# Patient Record
Sex: FEMALE | Race: OTHER
[De-identification: names, ages, dates, MRNs, and addresses within clinical notes are randomized per-mention and may not be internally consistent; named-entity substitution may affect disease eponyms.]

---

## 2021-04-20 ENCOUNTER — ASOB RESULT (OUTPATIENT)
Age: 31
End: 2021-04-20

## 2021-04-20 ENCOUNTER — APPOINTMENT (OUTPATIENT)
Dept: ANTEPARTUM | Facility: CLINIC | Age: 31
End: 2021-04-20
Payer: MEDICAID

## 2021-04-20 PROBLEM — Z00.00 ENCOUNTER FOR PREVENTIVE HEALTH EXAMINATION: Status: ACTIVE | Noted: 2021-04-20

## 2021-04-20 PROCEDURE — 76830 TRANSVAGINAL US NON-OB: CPT | Mod: 59

## 2021-04-20 PROCEDURE — 76856 US EXAM PELVIC COMPLETE: CPT | Mod: 59

## 2021-04-20 PROCEDURE — 99072 ADDL SUPL MATRL&STAF TM PHE: CPT

## 2021-05-11 ENCOUNTER — INPATIENT (INPATIENT)
Facility: HOSPITAL | Age: 31
LOS: 0 days | Discharge: HOME | DRG: 424 | End: 2021-05-12
Attending: HOSPITALIST | Admitting: INTERNAL MEDICINE
Payer: COMMERCIAL

## 2021-05-11 VITALS
HEIGHT: 55.12 IN | HEART RATE: 135 BPM | RESPIRATION RATE: 20 BRPM | OXYGEN SATURATION: 98 % | DIASTOLIC BLOOD PRESSURE: 98 MMHG | WEIGHT: 130.07 LBS | SYSTOLIC BLOOD PRESSURE: 143 MMHG | TEMPERATURE: 99 F

## 2021-05-11 LAB
ADD ON TEST-SPECIMEN IN LAB: SIGNIFICANT CHANGE UP
ADD ON TEST-SPECIMEN IN LAB: SIGNIFICANT CHANGE UP
APPEARANCE UR: CLEAR — SIGNIFICANT CHANGE UP
APTT BLD: 30.2 SEC — SIGNIFICANT CHANGE UP (ref 27.5–35.5)
BASOPHILS # BLD AUTO: 0.01 K/UL — SIGNIFICANT CHANGE UP (ref 0–0.2)
BASOPHILS NFR BLD AUTO: 0.1 % — SIGNIFICANT CHANGE UP (ref 0–2)
BILIRUB UR-MCNC: NEGATIVE — SIGNIFICANT CHANGE UP
COLOR SPEC: YELLOW — SIGNIFICANT CHANGE UP
DIFF PNL FLD: NEGATIVE — SIGNIFICANT CHANGE UP
EOSINOPHIL # BLD AUTO: 0.02 K/UL — SIGNIFICANT CHANGE UP (ref 0–0.5)
EOSINOPHIL NFR BLD AUTO: 0.3 % — SIGNIFICANT CHANGE UP (ref 0–6)
GLUCOSE UR QL: NEGATIVE MG/DL — SIGNIFICANT CHANGE UP
HCG SERPL-ACNC: <1 MIU/ML — SIGNIFICANT CHANGE UP
HCT VFR BLD CALC: 37.8 % — SIGNIFICANT CHANGE UP (ref 34.5–45)
HGB BLD-MCNC: 13 G/DL — SIGNIFICANT CHANGE UP (ref 11.5–15.5)
IMM GRANULOCYTES NFR BLD AUTO: 0.3 % — SIGNIFICANT CHANGE UP (ref 0–1.5)
INR BLD: 1.14 RATIO — SIGNIFICANT CHANGE UP (ref 0.88–1.16)
KETONES UR-MCNC: ABNORMAL
LACTATE SERPL-SCNC: 1.3 MMOL/L — SIGNIFICANT CHANGE UP (ref 0.7–2)
LEUKOCYTE ESTERASE UR-ACNC: ABNORMAL
LIDOCAIN IGE QN: 98 U/L — SIGNIFICANT CHANGE UP (ref 73–393)
LYMPHOCYTES # BLD AUTO: 2.47 K/UL — SIGNIFICANT CHANGE UP (ref 1–3.3)
LYMPHOCYTES # BLD AUTO: 31.7 % — SIGNIFICANT CHANGE UP (ref 13–44)
MCHC RBC-ENTMCNC: 25.9 PG — LOW (ref 27–34)
MCHC RBC-ENTMCNC: 34.4 GM/DL — SIGNIFICANT CHANGE UP (ref 32–36)
MCV RBC AUTO: 75.4 FL — LOW (ref 80–100)
MONOCYTES # BLD AUTO: 0.74 K/UL — SIGNIFICANT CHANGE UP (ref 0–0.9)
MONOCYTES NFR BLD AUTO: 9.5 % — SIGNIFICANT CHANGE UP (ref 2–14)
NEUTROPHILS # BLD AUTO: 4.53 K/UL — SIGNIFICANT CHANGE UP (ref 1.8–7.4)
NEUTROPHILS NFR BLD AUTO: 58.1 % — SIGNIFICANT CHANGE UP (ref 43–77)
NITRITE UR-MCNC: NEGATIVE — SIGNIFICANT CHANGE UP
PH UR: 5 — SIGNIFICANT CHANGE UP (ref 5–8)
PLATELET # BLD AUTO: 284 K/UL — SIGNIFICANT CHANGE UP (ref 150–400)
PROT UR-MCNC: NEGATIVE MG/DL — SIGNIFICANT CHANGE UP
PROTHROM AB SERPL-ACNC: 13.3 SEC — SIGNIFICANT CHANGE UP (ref 10.6–13.6)
RAPID RVP RESULT: SIGNIFICANT CHANGE UP
RBC # BLD: 5.01 M/UL — SIGNIFICANT CHANGE UP (ref 3.8–5.2)
RBC # FLD: 11.8 % — SIGNIFICANT CHANGE UP (ref 10.3–14.5)
SARS-COV-2 RNA SPEC QL NAA+PROBE: SIGNIFICANT CHANGE UP
SP GR SPEC: 1.02 — SIGNIFICANT CHANGE UP (ref 1.01–1.02)
TROPONIN I SERPL-MCNC: <0.015 NG/ML — SIGNIFICANT CHANGE UP (ref 0.01–0.04)
UROBILINOGEN FLD QL: NEGATIVE MG/DL — SIGNIFICANT CHANGE UP
WBC # BLD: 7.79 K/UL — SIGNIFICANT CHANGE UP (ref 3.8–10.5)
WBC # FLD AUTO: 7.79 K/UL — SIGNIFICANT CHANGE UP (ref 3.8–10.5)

## 2021-05-11 PROCEDURE — 73090 X-RAY EXAM OF FOREARM: CPT | Mod: 26,LT

## 2021-05-11 PROCEDURE — 83036 HEMOGLOBIN GLYCOSYLATED A1C: CPT

## 2021-05-11 PROCEDURE — 93005 ELECTROCARDIOGRAM TRACING: CPT

## 2021-05-11 PROCEDURE — 84480 ASSAY TRIIODOTHYRONINE (T3): CPT

## 2021-05-11 PROCEDURE — 86769 SARS-COV-2 COVID-19 ANTIBODY: CPT

## 2021-05-11 PROCEDURE — 84439 ASSAY OF FREE THYROXINE: CPT

## 2021-05-11 PROCEDURE — 74177 CT ABD & PELVIS W/CONTRAST: CPT | Mod: 26,MA

## 2021-05-11 PROCEDURE — 76536 US EXAM OF HEAD AND NECK: CPT

## 2021-05-11 PROCEDURE — 36415 COLL VENOUS BLD VENIPUNCTURE: CPT

## 2021-05-11 PROCEDURE — 71046 X-RAY EXAM CHEST 2 VIEWS: CPT | Mod: 26

## 2021-05-11 PROCEDURE — 73030 X-RAY EXAM OF SHOULDER: CPT | Mod: 26,LT

## 2021-05-11 PROCEDURE — 73060 X-RAY EXAM OF HUMERUS: CPT | Mod: 26,LT

## 2021-05-11 PROCEDURE — 93010 ELECTROCARDIOGRAM REPORT: CPT

## 2021-05-11 PROCEDURE — 99285 EMERGENCY DEPT VISIT HI MDM: CPT

## 2021-05-11 PROCEDURE — 84443 ASSAY THYROID STIM HORMONE: CPT

## 2021-05-11 PROCEDURE — 84436 ASSAY OF TOTAL THYROXINE: CPT

## 2021-05-11 PROCEDURE — 71260 CT THORAX DX C+: CPT | Mod: 26,MA

## 2021-05-11 RX ORDER — ACETAMINOPHEN 500 MG
1000 TABLET ORAL ONCE
Refills: 0 | Status: COMPLETED | OUTPATIENT
Start: 2021-05-11 | End: 2021-05-11

## 2021-05-11 RX ORDER — SODIUM CHLORIDE 9 MG/ML
2000 INJECTION INTRAMUSCULAR; INTRAVENOUS; SUBCUTANEOUS ONCE
Refills: 0 | Status: COMPLETED | OUTPATIENT
Start: 2021-05-11 | End: 2021-05-11

## 2021-05-11 RX ORDER — ACETAMINOPHEN 500 MG
650 TABLET ORAL ONCE
Refills: 0 | Status: DISCONTINUED | OUTPATIENT
Start: 2021-05-11 | End: 2021-05-12

## 2021-05-11 RX ADMIN — SODIUM CHLORIDE 1000 MILLILITER(S): 9 INJECTION INTRAMUSCULAR; INTRAVENOUS; SUBCUTANEOUS at 20:55

## 2021-05-11 RX ADMIN — Medication 1000 MILLIGRAM(S): at 19:07
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
## 2021-05-11 NOTE — ED STATDOCS - ENMT, MLM
Nasal mucosa clear.  Mouth with normal mucosa  Throat has no vesicles, no oropharyngeal exudates and uvula is midline. airway patent, face mask in place
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
## 2021-05-12 ENCOUNTER — TRANSCRIPTION ENCOUNTER (OUTPATIENT)
Age: 31
End: 2021-05-12

## 2021-05-12 VITALS — HEART RATE: 89 BPM | DIASTOLIC BLOOD PRESSURE: 66 MMHG | SYSTOLIC BLOOD PRESSURE: 130 MMHG

## 2021-05-12 DIAGNOSIS — R00.0 TACHYCARDIA, UNSPECIFIED: ICD-10-CM

## 2021-05-12 LAB
A1C WITH ESTIMATED AVERAGE GLUCOSE RESULT: 5.2 % — SIGNIFICANT CHANGE UP (ref 4–5.6)
ADD ON TEST-SPECIMEN IN LAB: SIGNIFICANT CHANGE UP
COVID-19 SPIKE DOMAIN AB INTERP: NEGATIVE — SIGNIFICANT CHANGE UP
COVID-19 SPIKE DOMAIN ANTIBODY RESULT: 0.4 U/ML — SIGNIFICANT CHANGE UP
ESTIMATED AVERAGE GLUCOSE: 103 MG/DL — SIGNIFICANT CHANGE UP (ref 68–114)
SARS-COV-2 IGG+IGM SERPL QL IA: 0.4 U/ML — SIGNIFICANT CHANGE UP
SARS-COV-2 IGG+IGM SERPL QL IA: NEGATIVE — SIGNIFICANT CHANGE UP
T3 SERPL-MCNC: 334 NG/DL — HIGH (ref 80–200)
T4 AB SER-ACNC: 15 UG/DL — HIGH (ref 4.6–12)
TSH SERPL-MCNC: <0.01 UU/ML — LOW (ref 0.34–4.82)

## 2021-05-12 PROCEDURE — 99236 HOSP IP/OBS SAME DATE HI 85: CPT

## 2021-05-12 PROCEDURE — 76536 US EXAM OF HEAD AND NECK: CPT | Mod: 26

## 2021-05-12 PROCEDURE — 93010 ELECTROCARDIOGRAM REPORT: CPT

## 2021-05-12 PROCEDURE — 12345: CPT | Mod: NC,GC

## 2021-05-12 RX ORDER — ATENOLOL 25 MG/1
50 TABLET ORAL DAILY
Refills: 0 | Status: DISCONTINUED | OUTPATIENT
Start: 2021-05-13 | End: 2021-05-12

## 2021-05-12 RX ORDER — METHIMAZOLE 10 MG/1
1 TABLET ORAL
Qty: 30 | Refills: 0
Start: 2021-05-12 | End: 2021-07-08

## 2021-05-12 RX ORDER — METHIMAZOLE 10 MG/1
1 TABLET ORAL
Qty: 30 | Refills: 0
Start: 2021-05-12 | End: 2021-06-10

## 2021-05-12 RX ORDER — ATENOLOL 25 MG/1
1 TABLET ORAL
Qty: 30 | Refills: 0
Start: 2021-05-12 | End: 2021-07-08

## 2021-05-12 RX ORDER — ATENOLOL 25 MG/1
25 TABLET ORAL DAILY
Refills: 0 | Status: DISCONTINUED | OUTPATIENT
Start: 2021-05-12 | End: 2021-05-12

## 2021-05-12 RX ORDER — ATENOLOL 25 MG/1
25 TABLET ORAL ONCE
Refills: 0 | Status: COMPLETED | OUTPATIENT
Start: 2021-05-12 | End: 2021-05-12

## 2021-05-12 RX ORDER — ATENOLOL 25 MG/1
1 TABLET ORAL
Qty: 30 | Refills: 0
Start: 2021-05-12 | End: 2021-06-10

## 2021-05-12 RX ADMIN — ATENOLOL 25 MILLIGRAM(S): 25 TABLET ORAL at 11:18

## 2021-05-12 RX ADMIN — ATENOLOL 25 MILLIGRAM(S): 25 TABLET ORAL at 09:39

## 2021-05-12 NOTE — DISCHARGE NOTE PROVIDER - CARE PROVIDER_API CALL
Lisa Youssef  FAMILY MEDICINE  [address] [place] [address]  Phone: [phone]  Fax: [phone]  Follow Up Time: 1 week

## 2021-05-12 NOTE — H&P ADULT - NSHPLABSRESULTS_GEN_ALL_CORE
EKG (personally reviewed): sinus tachycardia without ST elevations or depressions  CXR (personally reviewed): no obvious infiltrate, no pleural effusion, no cardiomegaly   Shoulder, humerus, forearm XR (personally reviewed): no fractures appreciated

## 2021-05-12 NOTE — H&P ADULT - NSHPREVIEWOFSYSTEMS_GEN_ALL_CORE
Review of Symptoms  Gen: +sweats, weight loss. no fevers, chills, fatigue  Visual: +intermittent blurry vision, no recent changes in vision, no seeing spots  Cardiovascular: +palpitations, no chest pain, no orthopnea, no leg swelling  Respiratory: no shortness of breath, no exertional dyspnea, no cough, no rhinorrhea, no nasal congestion  GI: no difficulty swallowing, no nausea, no vomiting, no abdominal pain, currently no diarrhea or no constipation, no melena  : +polyuria, no dysuria, no hematuria, no malodorous urine  Derm: no rashes  Heme: no easy bleeding or bruising  MSK: +lateral left arm pain, no joint pain, no joint swelling or redness, no extremity pain   Neuro: no headache, no numbness, no weakness, no memory loss  Psych: no depression, no anxiety, no SI
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
## 2021-05-12 NOTE — DISCHARGE NOTE NURSING/CASE MANAGEMENT/SOCIAL WORK - PATIENT PORTAL LINK FT
You can access the FollowMyHealth Patient Portal offered by [facility] by registering at the following website: [email or web address]. By joining [facility]’s FollowMyHealth portal, you will also be able to view your health information using other applications (apps) compatible with our system.
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
## 2021-05-12 NOTE — DISCHARGE NOTE PROVIDER - NSDCMRMEDTOKEN_GEN_ALL_CORE_FT
Tapazole 5 mg oral tablet: 1 tab(s) orally once a day  Tenormin 50 mg oral tablet: 1 tab(s) orally once a day

## 2021-05-12 NOTE — H&P ADULT - NSHPPHYSICALEXAM_GEN_ALL_CORE
Vitals  T(F): 98.1 (05-12-21 @ 03:05), Max: 99.6 (05-11-21 @ 20:23)  HR: 103 (05-12-21 @ 03:05) (97 - 135)  BP: 163/83 (05-12-21 @ 03:05) (128/63 - 163/83)  RR: 18 (05-12-21 @ 03:05) (18 - 20)  SpO2: 100% (05-12-21 @ 03:05) (97% - 100%)    Physical Exam   Gen: NAD, comfortable  HENT: atraumatic head and ears, no gross abnormalities of ears, mucous membranes moist, no oral lesions, possible enlarged thyroid  CV: RRR, nl s1/s2, no M/R/G  Pulm: nl respiratory effort, CTAB, no wheezes/crackles/rhonchi  Back: no scoliosis, lordosis, or kyphosis, no tenderness  Abd: normoactive bowel sounds in all 4 quadrants, soft, mild tenderness with deep palpation of lower abdomen, nondistended, no rebound, no guarding, no masses  Extremities: no pedal edema, pedal pulses palpable   Skin: nl warm and dry, +bruise on lateral left arm   Neuro: A&Ox3, answering questions appropriately, EOMI, face symmetric, sensation equal bilaterally in face, no dysarthria, 5/5 strength in upper and lower extremities bilaterally, sensation intact in upper and lower extremities bilaterally  Psych: no depression, no SI, no HI Vitals  T(F): 98.1 (05-12-21 @ 03:05), Max: 99.6 (05-11-21 @ 20:23)  HR: 103 (05-12-21 @ 03:05) (97 - 135)  BP: 163/83 (05-12-21 @ 03:05) (128/63 - 163/83)  RR: 18 (05-12-21 @ 03:05) (18 - 20)  SpO2: 100% (05-12-21 @ 03:05) (97% - 100%)    Physical Exam   Gen: NAD, comfortable  HENT: atraumatic head and ears, no gross abnormalities of ears, mucous membranes moist, no oral lesions, no thyromegaly  CV: tachycardic with regular rhythm, nl s1/s2, no M/R/G  Pulm: nl respiratory effort, CTAB, no wheezes/crackles/rhonchi  Back: no scoliosis, lordosis, or kyphosis, no tenderness  Abd: normoactive bowel sounds in all 4 quadrants, soft, mild tenderness with deep palpation of lower abdomen, nondistended, no rebound, no guarding, no masses  Extremities: no pedal edema, pedal pulses palpable   Skin: nl warm and dry, +bruise on lateral left arm   Neuro: A&Ox3, answering questions appropriately, EOMI, face symmetric, sensation equal bilaterally in face, no dysarthria, 5/5 strength in upper and lower extremities bilaterally, sensation intact in upper and lower extremities bilaterally  Psych: no depression, no SI, no HI
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
## 2021-05-12 NOTE — PATIENT PROFILE ADULT - OVER THE PAST TWO WEEKS, HAVE YOU FELT LITTLE INTEREST OR PLEASURE IN DOING THINGS?
----- Message from Ana Franz sent at 5/5/2020  1:35 PM CDT -----  Contact: self [phone]  JPB - Patient need to be scheduled for a 2 week follow up appointment. Please call  
Returned call and scheduled patients follow up visit.   
no

## 2021-05-12 NOTE — PATIENT PROFILE ADULT - SAFE PLACE TO LIVE
Prince nieves is asking that we get this note for him asapayton nieves is asking that the note for this needs to state the date that Dr Charity Gatica told her to stop physical therapy as well  Prince nieves is stating that the date s/b 6/26/18  Please let Prince nieves know when this is done  He wants to stop tomorrow to get the note      [phone] no

## 2021-05-12 NOTE — ED ADULT NURSE NOTE - CAS TRG GENERAL AIRWAY, MLM
Patient: Ignacia Tovar    Procedure Summary     Date Anesthesia Start Anesthesia Stop Room / Location    01/17/17 1524 1546  [facility] /  [facility]       Procedure Diagnosis Surgeon Provider    COLONOSCOPY TO CECUM/TI WITH POLYPECTOMY (COLD BX) (N/A ) Diverticulosis; Polyp of colon; Tortuous colon; Hemorrhoids  (Rectal bleeding [K62.5]; Hx of colonic polyps [Z86.010]; Change in bowel habits [R19.4]; Family history of GI malignancy [Z80.0]; Abdominal pain, unspecified location [R10.9]) MD Marcie Jenkins MD          Anesthesia Type: MAC  Last vitals  BP      Temp      Pulse     Resp      SpO2        Post Anesthesia Care and Evaluation    Patient participation: complete - patient participated  Level of consciousness: sleepy but conscious and responsive to verbal stimuli  Pain management: adequate  Airway patency: patent  Anesthetic complications: No anesthetic complications  PONV Status: none  Cardiovascular status: acceptable  Respiratory status: acceptable  Hydration status: acceptable       Patent

## 2021-05-17 LAB
CULTURE RESULTS: SIGNIFICANT CHANGE UP
SPECIMEN SOURCE: SIGNIFICANT CHANGE UP
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
## 2021-05-19 DIAGNOSIS — E03.9 HYPOTHYROIDISM, UNSPECIFIED: ICD-10-CM

## 2021-05-19 DIAGNOSIS — I10 ESSENTIAL (PRIMARY) HYPERTENSION: ICD-10-CM
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
## 2021-05-19 DIAGNOSIS — R07.9 CHEST PAIN, UNSPECIFIED: ICD-10-CM

## 2021-05-19 DIAGNOSIS — R35.8 OTHER POLYURIA: ICD-10-CM

## 2021-06-09 ENCOUNTER — EMERGENCY (EMERGENCY)
Facility: HOSPITAL | Age: 31
LOS: 0 days | Discharge: HOME | End: 2021-06-09
Attending: EMERGENCY MEDICINE
Payer: MEDICAID

## 2021-06-09 VITALS — HEIGHT: 55.12 IN

## 2021-06-09 VITALS
HEART RATE: 100 BPM | DIASTOLIC BLOOD PRESSURE: 79 MMHG | TEMPERATURE: 98 F | RESPIRATION RATE: 18 BRPM | OXYGEN SATURATION: 99 % | SYSTOLIC BLOOD PRESSURE: 148 MMHG

## 2021-06-09 DIAGNOSIS — I10 ESSENTIAL (PRIMARY) HYPERTENSION: ICD-10-CM

## 2021-06-09 DIAGNOSIS — Z76.0 ENCOUNTER FOR ISSUE OF REPEAT PRESCRIPTION: ICD-10-CM

## 2021-06-09 PROCEDURE — 99283 EMERGENCY DEPT VISIT LOW MDM: CPT

## 2021-06-09 PROCEDURE — 99281 EMR DPT VST MAYX REQ PHY/QHP: CPT

## 2021-06-09 NOTE — ED STATDOCS - PATIENT PORTAL LINK FT
You can access the FollowMyHealth Patient Portal offered by [facility] by registering at the following website: [email or web address]. By joining [employer]’s FollowMyHealth portal, you will also be able to view your health information using other applications (apps) compatible with our system.

## 2021-06-09 NOTE — ED STATDOCS - NSFOLLOWUPCLINICS_GEN_ALL_ED_FT
[facility]  Family Medicine  [address] [place] [address]  Phone: [phone]  Fax:

## 2021-06-09 NOTE — ED ADULT NURSE NOTE - OBJECTIVE STATEMENT
pt ambulatory to ED, A&O x4. requesting mediation refill. cannot find doctor that accepts her insurance.

## 2021-06-09 NOTE — ED STATDOCS - OBJECTIVE STATEMENT
30 y/o female with PMHx of HTN, hypothyroid presents to ED for medication refill. Reports HA today. Took Tylenol at 2 pm today, 7 hours PTA. 32 y/o female with PMHx of HTN, thyroid presents to ED for medication refill. Reports HA today. Took Tylenol at 2 pm today, 7 hours PTA. no dizziness, no nausea no vmiting no neck pain. unable to find a doctor that takes her insurance as her insurance changed

## 2021-06-09 NOTE — ED STATDOCS - NSFOLLOWUPINSTRUCTIONS_ED_ALL_ED_FT
Hyperthyroidism       El hipertiroidismo ocurre cuando la glándula tiroidea está demasiado activa (hiperactiva). La glándula tiroidea es yina pequeña glándula ubicada en la parte delantera inferior del magali, rivera jordan de la tráquea. Esta glándula produce hormonas que ayudan a controlar la forma en la que el cuerpo usa los alimentos para obtener energía (metabolismo) así jonnie también la función cardíaca y la función cerebral. Estas hormonas también juegan un papel para mantener los huesos yeyo. Cuando la tiroides está hiperactiva, produce yina cantidad excesiva de yina hormona denominada tiroxina.      ¿Cuáles son las causas?  Esta afección puede ser causada por lo siguiente:  •Enfermedad de Graves. Dhruv es un trastorno en el que el sistema del cuerpo encargado de combatir las enfermedades (sistema inmunitario) ataca la glándula tiroidea. Esta es la causa más frecuente.      •Inflamación de la glándula tiroidea.      •Un tumor en la glándula tiroidea.    •Uso de ciertos medicamentos, jonnie los siguientes:  •Reemplazo de hormona tiroidea recetado.      •Suplementos a base de hierbas que imitan a las hormonas tiroideas.      •Terapia con amiodarona.        •Bultos sólidos o llenos de líquido en la tiroides (nódulos tiroideos).      •Ingerir yina gran cantidad de yodo de alimentos o medicamentos.        ¿Qué incrementa el riesgo?  Es más probable que usted sufra esta afección si:  •Es mateo.      •Tiene antecedentes familiares de afecciones tiroideas.      •Fuma tabaco.      •Usa un medicamento denominado litio.      •Krystle medicamentos que afectan el sistema inmunitario (inmunosupresores).        ¿Cuáles son los signos o los síntomas?  Los síntomas de esta afección incluyen los siguientes:  •Nerviosismo.      •Incapacidad para tolerar el calor.      •Pérdida de peso sin causa aparente.      •Diarrea.      •Cambios en la textura del pelo o la piel.      •Falta de latidos cardíacos o más latidos cardíacos.      •Frecuencia cardíaca acelerada.      •Ausencia de la menstruación.      •Temblores en las kathy.      •Fatiga.      •Agitación.      •Problemas para dormir.      •Agrandamiento de la glándula tiroidea o un bulto en la tiroides (nódulo).    Es posible que también tenga síntomas de la enfermedad de Graves, los cuales pueden incluir:  •Ojos saltones.      •Ojos secos.      •Ojos rojos o hinchados.      •Problemas visuales.        ¿Cómo se diagnostica?  Esta afección se puede diagnosticar en función de lo siguiente:  •Berenice síntomas y antecedentes médicos.      •Un examen físico.      •Análisis de scott.      •Ecografía de la tiroides. En dhruv estudio, se utilizan ondas sonoras para generar imágenes de la glándula tiroidea.      •Gammagrafía tiroidea. Se inyecta yina sustancia radiactiva en yina vena y las imágenes muestran la cantidad de yodo presente en la tiroides.      •Prueba de captación de yodo radiactivo (radioactive iodine uptake test, RAIU). Yina pequeña cantidad de yodo radiactivo se administra por boca para determinar la cantidad de yodo que absorbe la tiroides después de un determinado período de tiempo.        ¿Cómo se trata?  El tratamiento depende de la causa y la gravedad de la afección. El tratamiento puede incluir lo siguiente:  •Medicamentos para reducir la cantidad de hormona tiroidea que produce gray cuerpo.      •Tratamiento con yodo radiactivo (terapia con yodo radiactivo). Consiste en tragar yina pequeña dosis de yodo radiactivo, en cápsula o líquido, a fin de destruir las células tiroideas.      •Cirugía para extirpar toda o parte de la glándula tiroidea. Es posible que necesite chidi medicamentos de reemplazo de hormona tiroidea por el jenna de gray gerard después de yina cirugía de la tiroides.      •Medicamentos para ayudar a aliviar los síntomas.        Siga estas indicaciones en gray casa:     •[place] [facility] los medicamentos de venta elio y los recetados solamente jonnie se lo haya indicado el médico.      • No consuma ningún producto que contenga nicotina o tabaco, jonnie cigarrillos y cigarrillos electrónicos. Si necesita ayuda para dejar de fumar, consulte al médico.      •Siga las indicaciones del médico con respecto a la dieta. Es posible que le indiquen limitar los alimentos que contengan yodo.    •Concurra a todas las visitas de control jonnie se lo haya indicado el médico. [place] es importante.  •Tendrá que hacerse análisis de scott periódicamente, de modo que el médico pueda controlar la afección.          Comuníquese con un médico si:    •Los síntomas no mejoran con el tratamiento.      •Tiene fiebre.    •Está tomando medicamentos de reemplazo de la hormona tiroidea y:  •Tiene síntomas de depresión.      •Se siente constantemente cansado.      •Aumenta de peso.          Solicite ayuda de inmediato si:    •Siente dolor en el pecho.      •Disminuyó gray estado de alerta o hay cambios en la conciencia.      •Siente dolor abdominal.      •Siente mareos.      •Tiene latidos cardíacos acelerados.      •Tiene latidos cardíacos irregulares.      •Tiene dificultad para respirar.        Resumen    •La glándula tiroidea es yina pequeña glándula ubicada en la parte delantera inferior del magali, rivera jordan de la tráquea.      •El hipertiroidismo ocurre cuando la glándula tiroidea está demasiado activa (hiperactiva) y produce yina cantidad excesiva de yina hormona denominada tiroxina.      •La causa más frecuente es la enfermedad de Graves, un trastorno por el cual el sistema inmunitario ataca la glándula tiroidea.      •El hipertiroidismo puede causar diferentes síntomas, por ejemplo, pérdida de peso inexplicable, nerviosismo, incapacidad de tolerar el calor o cambios en los latidos cardíacos.      •El tratamiento puede incluir medicamentos para reducir la cantidad de hormona tiroidea que produce gray cuerpo, terapia con yodo radiactivo, cirugía o medicamentos para controlar los síntomas.      Esta información no tiene jonnie fin reemplazar el consejo del médico. Asegúrese de hacerle al médico cualquier pregunta que tenga.      Document Revised: 02/07/2019 Document Reviewed: 02/07/2019    Elsevier Patient Education © 2021 [employer] Inc[employer]

## 2021-06-10 PROBLEM — Z78.9 OTHER SPECIFIED HEALTH STATUS: Chronic | Status: ACTIVE | Noted: 2021-05-12

## 2024-10-07 NOTE — DISCHARGE NOTE NURSING/CASE MANAGEMENT/SOCIAL WORK - WILL THE PATIENT ACCEPT THE PFIZER COVID-19 VACCINE IF ELIGIBLE AND IT IS AVAILABLE?
Care Management Follow Up Note    Length of Stay (days) 38     Patient plan of care discussed at Interdisciplinary Rounds: yes           Pt/Rep Education / Interventions: Review d/c options, Discuss d/c, Discussed readmission risk    Expected Discharge Date: TBD  Concerns to be Addressed / Barriers to Discharge:  Intubated, COVID 19, medical progression    Anticipated Discharge Disposition:  TBD  Anticipated Discharge Services:    TBD  Selected Continued Care - Admitted Since 11/13/2020    No services have been selected for the patient.        Anticipated Discharge DME:  TBD    Plan:  CM completed chart review. No care management needs identified at this time. Care Management will continue to follow progression or care and assist as needed.     DIEUDONNE Cardozo     Not applicable DISPLAY PLAN FREE TEXT